# Patient Record
Sex: MALE | Race: WHITE | NOT HISPANIC OR LATINO | Employment: FULL TIME | URBAN - METROPOLITAN AREA
[De-identification: names, ages, dates, MRNs, and addresses within clinical notes are randomized per-mention and may not be internally consistent; named-entity substitution may affect disease eponyms.]

---

## 2018-01-18 ENCOUNTER — APPOINTMENT (OUTPATIENT)
Dept: GENERAL RADIOLOGY | Facility: HOSPITAL | Age: 28
End: 2018-01-18

## 2018-01-18 ENCOUNTER — HOSPITAL ENCOUNTER (EMERGENCY)
Facility: HOSPITAL | Age: 28
Discharge: HOME | End: 2018-01-18
Attending: EMERGENCY MEDICINE | Admitting: EMERGENCY MEDICINE

## 2018-01-18 VITALS
SYSTOLIC BLOOD PRESSURE: 107 MMHG | HEART RATE: 83 BPM | RESPIRATION RATE: 16 BRPM | BODY MASS INDEX: 26.37 KG/M2 | DIASTOLIC BLOOD PRESSURE: 78 MMHG | TEMPERATURE: 98 F | OXYGEN SATURATION: 95 % | HEIGHT: 68 IN | WEIGHT: 174 LBS

## 2018-01-18 DIAGNOSIS — J11.1 INFLUENZA: Primary | ICD-10-CM

## 2018-01-18 LAB
FLUAV AG NPH QL: NEGATIVE
FLUBV AG NPH QL IA: NEGATIVE

## 2018-01-18 PROCEDURE — 87804 INFLUENZA ASSAY W/OPTIC: CPT | Performed by: EMERGENCY MEDICINE

## 2018-01-18 PROCEDURE — 99283 EMERGENCY DEPT VISIT LOW MDM: CPT

## 2018-01-18 PROCEDURE — 71046 X-RAY EXAM CHEST 2 VIEWS: CPT

## 2018-01-18 RX ORDER — OSELTAMIVIR PHOSPHATE 75 MG/1
75 CAPSULE ORAL 2 TIMES DAILY
Qty: 10 CAPSULE | Refills: 0 | Status: SHIPPED | OUTPATIENT
Start: 2018-01-18 | End: 2018-01-23

## 2018-01-19 NOTE — ED PROVIDER NOTES
Subjective   History of Present Illness  Is 27-year-old gentleman presents the emergency department development of abrupt onset of fever earlier this evening associated with a dry hacking nonproductive cough.  The patient is had no other particular symptoms.  He did note a headache this morning upon arising from bed but that has since resolved.  He denies associated sore throat.  He's had no nausea vomiting or diarrhea.  He's had no rashes.  He did not receive a flu shot this year.    Past medical history benign    Current medications none    Social history does not smoke drink or utilize drugs  Review of Systems   Constitutional: Positive for chills and fever.   HENT: Positive for congestion. Negative for sore throat and trouble swallowing.    Respiratory: Positive for cough. Negative for chest tightness and shortness of breath.    Cardiovascular: Negative for chest pain.   Gastrointestinal: Negative for abdominal pain, diarrhea, nausea and vomiting.   Genitourinary: Negative for dysuria, frequency and urgency.   Skin: Negative for rash.   All other systems reviewed and are negative.      History reviewed. No pertinent past medical history.    No Known Allergies    History reviewed. No pertinent surgical history.    History reviewed. No pertinent family history.    Social History     Social History   • Marital status:      Spouse name: N/A   • Number of children: N/A   • Years of education: N/A     Social History Main Topics   • Smoking status: Never Smoker   • Smokeless tobacco: Never Used   • Alcohol use No   • Drug use: No   • Sexual activity: Not Asked     Other Topics Concern   • None     Social History Narrative   • None           Objective   Physical Exam   Constitutional: He is oriented to person, place, and time. He appears well-developed and well-nourished. No distress.   HENT:   Head: Normocephalic and atraumatic.   Mouth/Throat: Oropharynx is clear and moist.   Eyes: Pupils are equal, round, and  reactive to light. No scleral icterus.   Neck: Neck supple. No JVD present.   Cardiovascular: Regular rhythm and normal heart sounds.    No murmur heard.  Pulmonary/Chest: Effort normal and breath sounds normal. No respiratory distress. He has no wheezes. He has no rales.   Abdominal: Soft. Bowel sounds are normal. He exhibits no distension. There is no tenderness. There is no rebound and no guarding.   Musculoskeletal: He exhibits no edema.   Lymphadenopathy:     He has no cervical adenopathy.   Neurological: He is alert and oriented to person, place, and time. No cranial nerve deficit. Coordination normal.   Skin: Skin is warm and dry. No rash noted. He is not diaphoretic.   Psychiatric: He has a normal mood and affect. His behavior is normal.   Nursing note and vitals reviewed.    Patient's chest radiograph is been read as negative.  Rubs are likewise negative however I spoke with the patient and his parent at length about the false negative rate of influenza swabs which is under ideal circumstances as high as 20%.  I have explained that in the flu season the symptoms of abrupt onset of fever or chills dry nonproductive cough as well as intermittent headache is most likely influenza regardless of the flu swabs.  I further explained that Tamiflu would be the recommended treatment though it's overall utility appears to be minimal.  The patient and his parent understand this.  Procedures         ED Course  ED Course                  MDM    Final diagnoses:   Influenza            Colten Ragsdale MD  01/19/18 4302

## 2020-07-26 ENCOUNTER — HOSPITAL ENCOUNTER (EMERGENCY)
Facility: HOSPITAL | Age: 30
Discharge: HOME | End: 2020-07-26
Attending: EMERGENCY MEDICINE | Admitting: EMERGENCY MEDICINE

## 2020-07-26 ENCOUNTER — APPOINTMENT (OUTPATIENT)
Dept: CT IMAGING | Facility: HOSPITAL | Age: 30
End: 2020-07-26

## 2020-07-26 VITALS
DIASTOLIC BLOOD PRESSURE: 82 MMHG | HEIGHT: 68 IN | HEART RATE: 85 BPM | RESPIRATION RATE: 16 BRPM | WEIGHT: 175 LBS | SYSTOLIC BLOOD PRESSURE: 123 MMHG | BODY MASS INDEX: 26.52 KG/M2 | OXYGEN SATURATION: 98 % | TEMPERATURE: 98.7 F

## 2020-07-26 DIAGNOSIS — R42 DIZZINESS: Primary | ICD-10-CM

## 2020-07-26 LAB
ALBUMIN SERPL-MCNC: 4.3 G/DL (ref 3.5–5.2)
ALBUMIN/GLOB SERPL: 2 G/DL
ALP SERPL-CCNC: 51 U/L (ref 39–117)
ALT SERPL W P-5'-P-CCNC: 33 U/L (ref 1–41)
ANION GAP SERPL CALCULATED.3IONS-SCNC: 9 MMOL/L (ref 5–15)
AST SERPL-CCNC: 26 U/L (ref 1–40)
BASOPHILS # BLD AUTO: 0.03 10*3/MM3 (ref 0–0.2)
BASOPHILS NFR BLD AUTO: 0.5 % (ref 0–1.5)
BILIRUB SERPL-MCNC: 0.6 MG/DL (ref 0–1.2)
BUN SERPL-MCNC: 14 MG/DL (ref 6–20)
BUN/CREAT SERPL: 12.4 (ref 7–25)
CALCIUM SPEC-SCNC: 9.3 MG/DL (ref 8.6–10.5)
CHLORIDE SERPL-SCNC: 103 MMOL/L (ref 98–107)
CO2 SERPL-SCNC: 28 MMOL/L (ref 22–29)
CREAT SERPL-MCNC: 1.13 MG/DL (ref 0.76–1.27)
DEPRECATED RDW RBC AUTO: 40.2 FL (ref 37–54)
EOSINOPHIL # BLD AUTO: 0.19 10*3/MM3 (ref 0–0.4)
EOSINOPHIL NFR BLD AUTO: 3.2 % (ref 0.3–6.2)
ERYTHROCYTE [DISTWIDTH] IN BLOOD BY AUTOMATED COUNT: 12.9 % (ref 12.3–15.4)
GFR SERPL CREATININE-BSD FRML MDRD: 76 ML/MIN/1.73
GLOBULIN UR ELPH-MCNC: 2.2 GM/DL
GLUCOSE SERPL-MCNC: 104 MG/DL (ref 65–99)
HCT VFR BLD AUTO: 44 % (ref 37.5–51)
HGB BLD-MCNC: 14.6 G/DL (ref 13–17.7)
IMM GRANULOCYTES # BLD AUTO: 0.01 10*3/MM3 (ref 0–0.05)
IMM GRANULOCYTES NFR BLD AUTO: 0.2 % (ref 0–0.5)
LYMPHOCYTES # BLD AUTO: 1.85 10*3/MM3 (ref 0.7–3.1)
LYMPHOCYTES NFR BLD AUTO: 31.6 % (ref 19.6–45.3)
MCH RBC QN AUTO: 28.8 PG (ref 26.6–33)
MCHC RBC AUTO-ENTMCNC: 33.2 G/DL (ref 31.5–35.7)
MCV RBC AUTO: 86.8 FL (ref 79–97)
MONOCYTES # BLD AUTO: 0.46 10*3/MM3 (ref 0.1–0.9)
MONOCYTES NFR BLD AUTO: 7.8 % (ref 5–12)
NEUTROPHILS NFR BLD AUTO: 3.32 10*3/MM3 (ref 1.7–7)
NEUTROPHILS NFR BLD AUTO: 56.7 % (ref 42.7–76)
NRBC BLD AUTO-RTO: 0 /100 WBC (ref 0–0.2)
PLATELET # BLD AUTO: 202 10*3/MM3 (ref 140–450)
PMV BLD AUTO: 11 FL (ref 6–12)
POTASSIUM SERPL-SCNC: 3.9 MMOL/L (ref 3.5–5.2)
PROT SERPL-MCNC: 6.5 G/DL (ref 6–8.5)
RBC # BLD AUTO: 5.07 10*6/MM3 (ref 4.14–5.8)
SODIUM SERPL-SCNC: 140 MMOL/L (ref 136–145)
WBC # BLD AUTO: 5.86 10*3/MM3 (ref 3.4–10.8)

## 2020-07-26 PROCEDURE — 99284 EMERGENCY DEPT VISIT MOD MDM: CPT

## 2020-07-26 PROCEDURE — 70450 CT HEAD/BRAIN W/O DYE: CPT

## 2020-07-26 PROCEDURE — 93005 ELECTROCARDIOGRAM TRACING: CPT | Performed by: EMERGENCY MEDICINE

## 2020-07-26 PROCEDURE — 36415 COLL VENOUS BLD VENIPUNCTURE: CPT

## 2020-07-26 PROCEDURE — 80053 COMPREHEN METABOLIC PANEL: CPT | Performed by: EMERGENCY MEDICINE

## 2020-07-26 PROCEDURE — 85025 COMPLETE CBC W/AUTO DIFF WBC: CPT | Performed by: EMERGENCY MEDICINE

## 2020-07-26 RX ORDER — MECLIZINE HYDROCHLORIDE 25 MG/1
25 TABLET ORAL 3 TIMES DAILY PRN
Qty: 15 TABLET | Refills: 0 | Status: SHIPPED | OUTPATIENT
Start: 2020-07-26

## 2020-07-26 RX ORDER — SODIUM CHLORIDE 0.9 % (FLUSH) 0.9 %
10 SYRINGE (ML) INJECTION AS NEEDED
Status: DISCONTINUED | OUTPATIENT
Start: 2020-07-26 | End: 2020-07-26 | Stop reason: HOSPADM

## 2020-07-26 RX ORDER — MECLIZINE HYDROCHLORIDE 25 MG/1
25 TABLET ORAL ONCE
Status: COMPLETED | OUTPATIENT
Start: 2020-07-26 | End: 2020-07-26

## 2020-07-26 RX ADMIN — MECLIZINE HYDROCHLORIDE 25 MG: 25 TABLET ORAL at 19:05

## 2020-07-26 RX ADMIN — SODIUM CHLORIDE 1000 ML: 9 INJECTION, SOLUTION INTRAVENOUS at 19:13

## 2020-07-26 NOTE — ED PROVIDER NOTES
EMERGENCY DEPARTMENT ENCOUNTER      Pt Name: Herman Pinzon  MRN: [id number]  YOB: 1990  Date of evaluation: 7/26/2020  Provider: Etienne To DO    CHIEF COMPLAINT       Chief Complaint   Patient presents with   • Dizziness         HISTORY OF PRESENT ILLNESS  (Location/Symptom, Timing/Onset, Context/Setting, Quality, Duration, Modifying Factors, Severity.)   Herman Pinzon is a 30 y.o. male who presents to the emergency department for evaluation of dizziness with positional changes, initially started noticing the onset of symptoms yesterday but they were very mild in nature.  He notes with sitting up or changing positions he feels dizzy lightheaded.  Worse with movement and ambulation.  Denies any lateral weakness, numbness or tingling.  At rest his symptoms have improved.  Woke up today and started having the similar symptoms although the symptoms are worsening.  He denies any headache, vision changes, neck pain or stiffness, no fevers or chills, recent illness.  The patient denies any history of recurrent vertiginous type symptoms, denies any ear pain, loss of vision or hearing.  Denies any recent viral illness.  The patient does work out pretty consistently, states has been staying fluid hydrated.  Denies any significant family history of any neurological deficit.  No significant medical history himself.  Does state last week had some loose stools, nonbloody in nature which did resolve after Imodium.  He denies any other acute systemic complaints at this time.      Nursing notes were reviewed.    REVIEW OF SYSTEMS    (2-9 systems for level 4, 10 or more for level 5)   ROS:  General:  No fevers, no chills, no weakness  Cardiovascular:  No chest pain, no palpitations  Respiratory:  No shortness of breath, no cough, no wheezing  Gastrointestinal:  No pain, no nausea, no vomiting, positive intermittent diarrhea  Musculoskeletal:  No muscle pain, no joint pain  Skin:  No rash, no easy  "bruising  Neurologic: Positive dizziness, no speech problems, no headache, no extremity numbness, no extremity tingling, no extremity weakness  Psychiatric:  No anxiety  Genitourinary:  No dysuria, no hematuria    Except as noted above the remainder of the review of systems was reviewed and negative.       PAST MEDICAL HISTORY   No past medical history on file.      SURGICAL HISTORY     No past surgical history on file.      CURRENT MEDICATIONS       Current Facility-Administered Medications:   •  [COMPLETED] Insert peripheral IV, , , Once **AND** sodium chloride 0.9 % flush 10 mL, 10 mL, Intravenous, PRN, Etienne To, DO    Current Outpatient Medications:   •  meclizine (ANTIVERT) 25 MG tablet, Take 1 tablet by mouth 3 (Three) Times a Day As Needed for Dizziness., Disp: 15 tablet, Rfl: 0    ALLERGIES     Patient has no known allergies.    FAMILY HISTORY     No family history on file.       SOCIAL HISTORY       Social History     Socioeconomic History   • Marital status:      Spouse name: Not on file   • Number of children: Not on file   • Years of education: Not on file   • Highest education level: Not on file   Tobacco Use   • Smoking status: Never Smoker   • Smokeless tobacco: Never Used   Substance and Sexual Activity   • Alcohol use: No   • Drug use: No         PHYSICAL EXAM    (up to 7 for level 4, 8 or more for level 5)     Vitals:    07/26/20 1750 07/26/20 1752 07/26/20 1900   BP:  146/92 131/82   BP Location:  Left arm    Patient Position:  Sitting    Pulse:  64 85   Resp:  16 16   Temp: 98.7 °F (37.1 °C)     TempSrc: Oral     SpO2:  95% 99%   Weight: 79.4 kg (175 lb)     Height: 172.7 cm (68\")         Physical Exam  General :Patient is awake, alert, oriented, in no acute distress, nontoxic appearing  HEENT: Pupils are equally round and reactive to light, EOMI, conjunctivae clear, sclerae white, there is no injection no icterus.  Oral mucosa is moist, no exudate. Uvula is midline. TMs clear " bilaterally.   Neck: Neck is supple, full range of motion, trachea midline  Cardiac: Heart regular rate, rhythm, no murmurs, rubs, or gallops  Lungs: Lungs are clear to auscultation, there is no wheezing, rhonchi, or rales. There is no use of accessory muscles.  Abdomen: Abdomen is soft, nontender, nondistended. There is no firm or pulsatile masses, no rebound rigidity or guarding.   Musculoskeletal: 5 out of 5 strength in all 4 extremities.  No focal muscle deficits are appreciated  Neuro: GCS 15, no focal neurological deficit, patient is fully awake and alert, answer questions appropriately.  Extraocular muscles are intact, very mild fast beat nystagmus with left lateral ocular deviation, this is horizontal in nature, there is no rotatory or vertical nystagmus.  No pronator drift, normal finger-to-nose, equal strength bilateral upper extremities, motor sensation of bilateral upper and lower extremities is equal and intact.  Motor intact, sensory intact, level of consciousness is normal, cerebellar function is normal, reflexes are grossly normal.  Dermatology: Skin is warm and dry  Psych: Mentation is grossly normal, cognition is grossly normal. Affect is appropriate.      DIAGNOSTIC RESULTS     EKG: All EKG's are interpreted by the Emergency Department Physician who either signs or Co-signs this chart in the absence of a cardiologist.    ECG 12 Lead   Final Result   Test Reason : dizziness   Blood Pressure : **/** mmHG   Vent. Rate : 058 BPM     Atrial Rate : 058 BPM      P-R Int : 130 ms          QRS Dur : 078 ms       QT Int : 404 ms       P-R-T Axes : 030 022 041 degrees      QTc Int : 396 ms      Sinus bradycardia   Otherwise normal ECG   No previous ECGs available   Confirmed by MAGGIE LEE MD ([id number]) on 7/26/2020 6:38:31 PM      Referred By:  edmd           Confirmed By:MAGGIE LEE MD          RADIOLOGY:   Non-plain film images such as CT, Ultrasound and MRI are read by the radiologist. Plain  radiographic images are visualized and preliminarily interpreted by the emergency physician with the below findings:      [] Radiologist's Report Reviewed:  CT Head Without Contrast   Final Result   Normal, negative unenhanced head CT.               Signer Name: Ronni Salazar MD    Signed: 7/26/2020 7:11 PM    Workstation Name: [email or web address]     [facility]            ED BEDSIDE ULTRASOUND:   Performed by ED Physician - none    LABS:    I have reviewed and interpreted all of the currently available lab results from this visit (if applicable):  Results for orders placed or performed during the hospital encounter of 07/26/20   Comprehensive Metabolic Panel   Result Value Ref Range    Glucose 104 (H) 65 - 99 mg/dL    BUN 14 6 - 20 mg/dL    Creatinine 1.13 0.76 - 1.27 mg/dL    Sodium 140 136 - 145 mmol/L    Potassium 3.9 3.5 - 5.2 mmol/L    Chloride 103 98 - 107 mmol/L    CO2 28.0 22.0 - 29.0 mmol/L    Calcium 9.3 8.6 - 10.5 mg/dL    Total Protein 6.5 6.0 - 8.5 g/dL    Albumin 4.30 3.50 - 5.20 g/dL    ALT (SGPT) 33 1 - 41 U/L    AST (SGOT) 26 1 - 40 U/L    Alkaline Phosphatase 51 39 - 117 U/L    Total Bilirubin 0.6 0.0 - 1.2 mg/dL    eGFR Non African Amer 76 >60 mL/min/1.73    Globulin 2.2 gm/dL    A/G Ratio 2.0 g/dL    BUN/Creatinine Ratio 12.4 7.0 - 25.0    Anion Gap 9.0 5.0 - 15.0 mmol/L   CBC Auto Differential   Result Value Ref Range    WBC 5.86 3.40 - 10.80 10*3/mm3    RBC 5.07 4.14 - 5.80 10*6/mm3    Hemoglobin 14.6 13.0 - 17.7 g/dL    Hematocrit 44.0 37.5 - 51.0 %    MCV 86.8 79.0 - 97.0 fL    MCH 28.8 26.6 - 33.0 pg    MCHC 33.2 31.5 - 35.7 g/dL    RDW 12.9 12.3 - 15.4 %    RDW-SD 40.2 37.0 - 54.0 fl    MPV 11.0 6.0 - 12.0 fL    Platelets 202 140 - 450 10*3/mm3    Neutrophil % 56.7 42.7 - 76.0 %    Lymphocyte % 31.6 19.6 - 45.3 %    Monocyte % 7.8 5.0 - 12.0 %    Eosinophil % 3.2 0.3 - 6.2 %    Basophil % 0.5 0.0 - 1.5 %    Immature Grans % 0.2 0.0 - 0.5 %    Neutrophils, Absolute 3.32 1.70 -  "7.00 10*3/mm3    Lymphocytes, Absolute 1.85 0.70 - 3.10 10*3/mm3    Monocytes, Absolute 0.46 0.10 - 0.90 10*3/mm3    Eosinophils, Absolute 0.19 0.00 - 0.40 10*3/mm3    Basophils, Absolute 0.03 0.00 - 0.20 10*3/mm3    Immature Grans, Absolute 0.01 0.00 - 0.05 10*3/mm3    nRBC 0.0 0.0 - 0.2 /100 WBC        All other labs were within normal range or not returned as of this dictation.      EMERGENCY DEPARTMENT COURSE and DIFFERENTIAL DIAGNOSIS/MDM:   Vitals:    Vitals:    07/26/20 1750 07/26/20 1752 07/26/20 1900   BP:  146/92 131/82   BP Location:  Left arm    Patient Position:  Sitting    Pulse:  64 85   Resp:  16 16   Temp: 98.7 °F (37.1 °C)     TempSrc: Oral     SpO2:  95% 99%   Weight: 79.4 kg (175 lb)     Height: 172.7 cm (68\")              Patient with dizziness, worse with positional changes over the last 1.5 days.  Is not appear acutely or toxic, vital signs are stable, no focal neurological deficit on examination.  Superior vertiginous in nature, associated with positional changes, associated nausea/vomiting.  On arrival the patient's vital signs are stable, we discussed obtaining IV, labs, checking electrolytes, patient was given IV fluids, meclizine.  Neuro imaging obtained secondary to patient's concerned and no prior neuro imaging.  Results reviewed as above.  CT scan with no significant normalities, CBC, CMP also unremarkable.  Radiology results, after IV fluids and meclizine her symptoms have improved.  We discussed Epley maneuvers for a semicircular canal aggressive treatments, maintain good fluid hydration of the next few days, Antivert.  Follow with PCP for reevaluation, referral to neurology as needed.  Return precautions discussed. I had a discussion with the patient/family regarding diagnosis, diagnostic results, treatment plan, and medications.  The patient/family indicated understanding of these instructions.  I spent adequate time at the bedside proceeding discharge necessary to personally " discuss the aftercare instructions, giving patient education, providing explanations of the results of our evaluations/findings, and my decision making to assure that the patient/family understand the plan of care.  Time was allotted to answer questions at that time and throughout the ED course.  Emphasis was placed on timely follow-up after discharge.  I also discussed the potential for the development of an acute emergent condition requiring further evaluation, admission, or even surgical intervention. I discussed that we found nothing during the visit today indicating the need for further workup, admission, or the presence of an unstable medical condition.  I encouraged the patient to return to the emergency department immediately for ANY concerns, worsening, new complaints, or if symptoms persist and unable to seek follow-up in a timely fashion.  The patient/family expressed understanding and agreement with this plan.  The patient will follow-up with their PCP in 1-2 days for reevaluation.       MEDICATIONS ADMINISTERED IN ED:  Medications   sodium chloride 0.9 % flush 10 mL (has no administration in time range)   sodium chloride 0.9 % bolus 1,000 mL (1,000 mL Intravenous New Bag 7/26/20 1913)   meclizine (ANTIVERT) tablet 25 mg (25 mg Oral Given 7/26/20 1905)       PROCEDURES:  Procedures    CRITICAL CARE TIME    Total Critical Care time was 0 minutes, excluding separately reportable procedures.   There was a high probability of clinically significant/life threatening deterioration in the patient's condition which required my urgent intervention.      FINAL IMPRESSION      1. Dizziness          DISPOSITION/PLAN     ED Disposition     ED Disposition Condition Comment    Discharge Stable           PATIENT REFERRED TO:  PCP or one on the list provided    Schedule an appointment as soon as possible for a visit       [facility] Emergency Department  [address] [facility]  [address]  [phone]    If symptoms worsen      DISCHARGE MEDICATIONS:     Medication List      START taking these medications    meclizine 25 MG tablet  Commonly known as:  ANTIVERT  Take 1 tablet by mouth 3 (Three) Times a Day As Needed for Dizziness.        STOP taking these medications    azithromycin 250 MG tablet  Commonly known as:  Zithromax Z-Kit              Comment: Please note this report has been produced using speech recognition software.      Etienne To DO  Attending Emergency Physician               Etienne To,   07/26/20 1945       Etienne To DO  07/26/20 1948

## 2020-07-26 NOTE — DISCHARGE INSTRUCTIONS
Follow up with one of the [facility] Primary Care Providers below to setup primary care. If you need assistance coordinating a primary care appointment with a [facility] Primary Care Provider, please contact the Primary Care Coordinators at [phone] for appointment scheduling.    [facility], Primary Care   [address] , [address]   [place], [place] [address] [phone]    [facility] Internal Medicine & Endocrinology  [address], [address]  [place] [address] [phone]    [facility] Family Medicine  [address], [address]   [place] [address] [phone]    [facility] Primary Care  [address], [address]  [place], [place] [id number] [phone]    [facility], Primary Care,   [address], [address]   [place], [place] [address] [phone]    [facility] Primary Care  [address]  [place], [place] [id number] [phone]    [facility], Primary Care  [address], [address]  [place], [place] [id number] [phone]    [facility] Internal Medicine & Pediatrics  [address], [address]   [place] [address] [phone]    [facility], Primary Care  [address] [place] [address] [phone]      [facility] Primary Care  [address], [address]   [place] [address] [phone]    [facility] Family Medicine  [address] [place] [address] [phone]